# Patient Record
Sex: FEMALE | Race: BLACK OR AFRICAN AMERICAN | NOT HISPANIC OR LATINO | Employment: UNEMPLOYED | URBAN - METROPOLITAN AREA
[De-identification: names, ages, dates, MRNs, and addresses within clinical notes are randomized per-mention and may not be internally consistent; named-entity substitution may affect disease eponyms.]

---

## 2021-09-17 ENCOUNTER — TRANSFERRED RECORDS (OUTPATIENT)
Dept: HEALTH INFORMATION MANAGEMENT | Facility: CLINIC | Age: 61
End: 2021-09-17

## 2021-09-17 LAB — EJECTION FRACTION: 75 %

## 2021-09-20 ENCOUNTER — TRANSFERRED RECORDS (OUTPATIENT)
Dept: HEALTH INFORMATION MANAGEMENT | Facility: CLINIC | Age: 61
End: 2021-09-20

## 2021-09-23 ENCOUNTER — TRANSFERRED RECORDS (OUTPATIENT)
Dept: HEALTH INFORMATION MANAGEMENT | Facility: CLINIC | Age: 61
End: 2021-09-23

## 2022-05-12 ENCOUNTER — HOSPITAL ENCOUNTER (EMERGENCY)
Facility: CLINIC | Age: 62
Discharge: HOME | End: 2022-05-12
Attending: EMERGENCY MEDICINE | Admitting: EMERGENCY MEDICINE
Payer: COMMERCIAL

## 2022-05-12 ENCOUNTER — APPOINTMENT (OUTPATIENT)
Dept: CARDIOLOGY | Facility: CLINIC | Age: 62
End: 2022-05-12
Attending: EMERGENCY MEDICINE
Payer: COMMERCIAL

## 2022-05-12 VITALS
OXYGEN SATURATION: 95 % | SYSTOLIC BLOOD PRESSURE: 132 MMHG | BODY MASS INDEX: 33.99 KG/M2 | DIASTOLIC BLOOD PRESSURE: 91 MMHG | WEIGHT: 180 LBS | HEIGHT: 61 IN | HEART RATE: 90 BPM | TEMPERATURE: 98.6 F | RESPIRATION RATE: 14 BRPM

## 2022-05-12 DIAGNOSIS — E87.6 HYPOKALEMIA: ICD-10-CM

## 2022-05-12 DIAGNOSIS — R00.2 PALPITATIONS: ICD-10-CM

## 2022-05-12 LAB
ALBUMIN SERPL-MCNC: 3.7 G/DL (ref 3.4–5)
ALP SERPL-CCNC: 62 U/L (ref 40–150)
ALT SERPL W P-5'-P-CCNC: 29 U/L (ref 0–50)
ANION GAP SERPL CALCULATED.3IONS-SCNC: 4 MMOL/L (ref 3–14)
AST SERPL W P-5'-P-CCNC: 21 U/L (ref 0–45)
ATRIAL RATE - MUSE: 91 BPM
BASOPHILS # BLD AUTO: 0 10E3/UL (ref 0–0.2)
BASOPHILS NFR BLD AUTO: 1 %
BILIRUB SERPL-MCNC: 0.4 MG/DL (ref 0.2–1.3)
BUN SERPL-MCNC: 18 MG/DL (ref 7–30)
CALCIUM SERPL-MCNC: 9.5 MG/DL (ref 8.5–10.1)
CHLORIDE BLD-SCNC: 109 MMOL/L (ref 94–109)
CO2 SERPL-SCNC: 28 MMOL/L (ref 20–32)
CREAT SERPL-MCNC: 0.93 MG/DL (ref 0.52–1.04)
DIASTOLIC BLOOD PRESSURE - MUSE: NORMAL MMHG
EOSINOPHIL # BLD AUTO: 0.1 10E3/UL (ref 0–0.7)
EOSINOPHIL NFR BLD AUTO: 2 %
ERYTHROCYTE [DISTWIDTH] IN BLOOD BY AUTOMATED COUNT: 14.8 % (ref 10–15)
GFR SERPL CREATININE-BSD FRML MDRD: 69 ML/MIN/1.73M2
GLUCOSE BLD-MCNC: 127 MG/DL (ref 70–99)
HCT VFR BLD AUTO: 40.4 % (ref 35–47)
HGB BLD-MCNC: 12.9 G/DL (ref 11.7–15.7)
HOLD SPECIMEN: NORMAL
IMM GRANULOCYTES # BLD: 0 10E3/UL
IMM GRANULOCYTES NFR BLD: 0 %
INTERPRETATION ECG - MUSE: NORMAL
LYMPHOCYTES # BLD AUTO: 2.5 10E3/UL (ref 0.8–5.3)
LYMPHOCYTES NFR BLD AUTO: 54 %
MCH RBC QN AUTO: 30.2 PG (ref 26.5–33)
MCHC RBC AUTO-ENTMCNC: 31.9 G/DL (ref 31.5–36.5)
MCV RBC AUTO: 95 FL (ref 78–100)
MONOCYTES # BLD AUTO: 0.3 10E3/UL (ref 0–1.3)
MONOCYTES NFR BLD AUTO: 8 %
NEUTROPHILS # BLD AUTO: 1.6 10E3/UL (ref 1.6–8.3)
NEUTROPHILS NFR BLD AUTO: 35 %
NRBC # BLD AUTO: 0 10E3/UL
NRBC BLD AUTO-RTO: 0 /100
P AXIS - MUSE: 43 DEGREES
PLATELET # BLD AUTO: 182 10E3/UL (ref 150–450)
POTASSIUM BLD-SCNC: 3 MMOL/L (ref 3.4–5.3)
PR INTERVAL - MUSE: 236 MS
PROT SERPL-MCNC: 7.6 G/DL (ref 6.8–8.8)
QRS DURATION - MUSE: 98 MS
QT - MUSE: 382 MS
QTC - MUSE: 469 MS
R AXIS - MUSE: 3 DEGREES
RBC # BLD AUTO: 4.27 10E6/UL (ref 3.8–5.2)
SODIUM SERPL-SCNC: 141 MMOL/L (ref 133–144)
SYSTOLIC BLOOD PRESSURE - MUSE: NORMAL MMHG
T AXIS - MUSE: 20 DEGREES
TROPONIN I SERPL HS-MCNC: 6 NG/L
TSH SERPL DL<=0.005 MIU/L-ACNC: 0.44 MU/L (ref 0.4–4)
VENTRICULAR RATE- MUSE: 91 BPM
WBC # BLD AUTO: 4.5 10E3/UL (ref 4–11)

## 2022-05-12 PROCEDURE — 93005 ELECTROCARDIOGRAM TRACING: CPT | Mod: 59

## 2022-05-12 PROCEDURE — 93248 EXT ECG>7D<15D REV&INTERPJ: CPT | Performed by: INTERNAL MEDICINE

## 2022-05-12 PROCEDURE — 250N000013 HC RX MED GY IP 250 OP 250 PS 637: Performed by: EMERGENCY MEDICINE

## 2022-05-12 PROCEDURE — 84443 ASSAY THYROID STIM HORMONE: CPT | Performed by: EMERGENCY MEDICINE

## 2022-05-12 PROCEDURE — 84484 ASSAY OF TROPONIN QUANT: CPT | Performed by: EMERGENCY MEDICINE

## 2022-05-12 PROCEDURE — 93246 EXT ECG>7D<15D RECORDING: CPT

## 2022-05-12 PROCEDURE — 99284 EMERGENCY DEPT VISIT MOD MDM: CPT | Mod: 25

## 2022-05-12 PROCEDURE — 36415 COLL VENOUS BLD VENIPUNCTURE: CPT | Performed by: EMERGENCY MEDICINE

## 2022-05-12 PROCEDURE — 85025 COMPLETE CBC W/AUTO DIFF WBC: CPT | Performed by: EMERGENCY MEDICINE

## 2022-05-12 PROCEDURE — 80053 COMPREHEN METABOLIC PANEL: CPT | Performed by: EMERGENCY MEDICINE

## 2022-05-12 RX ORDER — POTASSIUM CHLORIDE 1.5 G/1.58G
40 POWDER, FOR SOLUTION ORAL ONCE
Status: COMPLETED | OUTPATIENT
Start: 2022-05-12 | End: 2022-05-12

## 2022-05-12 RX ORDER — POTASSIUM CHLORIDE 1500 MG/1
20 TABLET, EXTENDED RELEASE ORAL 2 TIMES DAILY
Qty: 28 TABLET | Refills: 0 | Status: SHIPPED | OUTPATIENT
Start: 2022-05-12 | End: 2022-05-26

## 2022-05-12 RX ADMIN — POTASSIUM CHLORIDE 40 MEQ: 1.5 POWDER, FOR SOLUTION ORAL at 17:17

## 2022-05-12 NOTE — ED PROVIDER NOTES
"  History     Chief Complaint:  Palpitations       HPI   Marylou Roy is a 62 year old female who presents with an episode of acute onset of palpitations today.  Patient believes that her heart rate was somewhere between 120 and 150 bpm.  This is happened to her several times in the past.  When these episodes come on the patient has a sense of feeling unwell, a feeling of a strange sensation in her chest (not a pain) and a feeling of dizziness and near/presyncope, but she never has a syncopal spell.  The patient has had a work-up for this in [place] in the past and in [place] she is from.  The work-up is included at a minimum a stress EKG (and possibly echo), nuclear perfusion testing, and a Holter monitor that was applied for 2 weeks but only collected about 5 days worth of data.  No etiology to date has been found.  The last episode was about a year ago.  The patient is vacationing in [place] for about 2 months.  She had a repeat episode today where she had a racing heartbeat and felt lightheaded and came in for further assessment.    ROS:  Review of Systems  There is no headache.  No chest pain or shortness of breath at this time.  No abdominal pain.  No back pain.  No leg swelling.  Her thyroid was checked a year ago and was normal.  All other systems are negative.    Allergies:  No Known Allergies     Medications:    Patient notes that she takes losartan for high blood pressure she also takes hydrochlorothiazide.    Past Medical History:    Palpitations      Social History:   The patient is from [place].  She has had a work-up in [place].  She is vacationing in [place] for the next 2 months.  Her son is a paramedic.    PCP: No primary care provider on file.     Physical Exam   Patient Vitals for the past 24 hrs:   BP Temp Temp src Pulse Resp SpO2 Height Weight   05/12/22 1551 (!) 132/91 98.6  F (37  C) Oral 90 -- 95 % 1.549 m (5' 1\") 81.6 kg (180 lb)   05/12/22 1549 -- " 98.6  F (37  C) Oral 93 14 -- -- --        Physical Exam  General: Resting comfortably on the gurney  Head:  The scalp, face, and head appear normal  Eyes:  The pupils are equal, round, and reactive to light    There is no nystagmus    Extraocular muscles are intact    Conjunctivae and sclerae are normal  ENT:    The nose is normal    Pinnae are normal    The oropharynx is normal    Uvula is in the midline  Neck:  Normal range of motion    There is no rigidity noted    There is no midline cervical spine pain/tenderness    Trachea is in the midline    No mass is detected  CV:  Regular rate and underlying rhythm     Normal S1/S2, no S3/S4    No pathological murmur detected  Resp:  Lungs are clear    There is no tachypnea    Non-labored    No rales    No wheezing   GI:  Abdomen is soft, there is no rigidity    No distension    No tympani    No rebound tenderness     Non-surgical without peritoneal features  MS:  Normal muscular tone    Symmetric motor strength    No major joint effusions    No asymmetric leg swelling, no calf tenderness  Skin:  No rash or acute skin lesions noted  Neuro: Speech is normal and fluent  Psych:  Awake. Alert.      Normal affect.  Appropriate interactions.  Lymph: No anterior cervical lymphadenopathy noted              Emergency Department Course   ECG:  Twelve-lead EKG reveals normal sinus rhythm at 91 bpm.  She has a first-degree AV block with a NJ interval of 236 ms.  She has narrow QRS complexes at 98 ms.  Her QT interval is 382 ms with a QTC at 469 ms.  Her R axis is 3 degrees.  There is no evidence of ST or T wave abnormality.  There is no evidence of ischemia or myocardial infarction.  There is no evidence of Brugada syndrome.  This emergency department electrocardiogram was obtained at 1559 hrs.  It is read by Dr. Cooper      Laboratory:  Labs Ordered and Resulted from Time of ED Arrival to Time of ED Departure   COMPREHENSIVE METABOLIC PANEL - Abnormal       Result Value    Sodium 141       Potassium 3.0 (*)     Chloride 109      Carbon Dioxide (CO2) 28      Anion Gap 4      Urea Nitrogen 18      Creatinine 0.93      Calcium 9.5      Glucose 127 (*)     Alkaline Phosphatase 62      AST 21      ALT 29      Protein Total 7.6      Albumin 3.7      Bilirubin Total 0.4      GFR Estimate 69     TROPONIN I - Normal    Troponin I High Sensitivity 6     TSH WITH FREE T4 REFLEX - Normal    TSH 0.44     CBC WITH PLATELETS AND DIFFERENTIAL    WBC Count 4.5      RBC Count 4.27      Hemoglobin 12.9      Hematocrit 40.4      MCV 95      MCH 30.2      MCHC 31.9      RDW 14.8      Platelet Count 182      % Neutrophils 35      % Lymphocytes 54      % Monocytes 8      % Eosinophils 2      % Basophils 1      % Immature Granulocytes 0      NRBCs per 100 WBC 0      Absolute Neutrophils 1.6      Absolute Lymphocytes 2.5      Absolute Monocytes 0.3      Absolute Eosinophils 0.1      Absolute Basophils 0.0      Absolute Immature Granulocytes 0.0      Absolute NRBCs 0.0          Procedures       Emergency Department Course:             Reviewed:  I reviewed nursing notes, vitals and past medical history    Assessments:  4:15 PM   I obtained history and examined the patient as noted above.    I rechecked the patient and reviewed all findings.      Interventions:  Medications - No data to display     Disposition:  The patient was discharged to home.     Impression & Plan      Medical Decision Making:  This patient presents with an episode of palpitations that happened earlier today.  She has been worked up for this condition in the past and no etiology has been determined to date.  It clearly sounds like an episode of SVT (could be atrial fibrillation, could be AVNRT) as the patient feels a sense of racing heartbeat, she has objectively noted the tachycardia as high as 150, and she feels slightly presyncopal.  This has not been captured on Holter monitoring to date.  Her potassium was low today from her hydrochlorothiazide  and this could have been a precipitant or dysrhythmia.  Ventricular dysrhythmias are in the differential diagnosis but less likely.  She has had provocative testing for coronary artery disease screening which has been negative.  I will put the patient back on a Zio patch for 2 weeks to look for arrhythmias.  We will get her on potassium replacement.  I advised that she may need to take concomitant potassium in the future if she stays on hydrochlorothiazide.  We will have her follow-up with cardiology here.  The patient's laboratories are negative.  Screening high-sensitivity troponin is undetectable.  There is low likelihood for acute coronary syndrome or myocardial infarction.      Diagnosis:    ICD-10-CM    1. Palpitations  R00.2    2. Hypokalemia  E87.6         Discharge Medications:  New Prescriptions    POTASSIUM CHLORIDE ER (KLOR-CON M) 20 MEQ CR TABLET    Take 1 tablet (20 mEq) by mouth 2 times daily for 14 days        5/12/2022   Reese Cooper MD Rock, Michael P, MD  05/12/22 1817

## 2022-05-12 NOTE — ED TRIAGE NOTES
Per EMS report:called with report of faintness, dizziness, shortness of breath, nausea, and palpitations. EKG en route noted first degree block. 4L of o2 applied en route. zofran given en route. Reports having similar episode in the past, having workup in [place] but came back inconclusive. [place]oved [place] from there approximately 4 months ago.      Triage Assessment     Row Name 05/12/22 1448       Triage Assessment (Adult)    Airway WDL WDL       Respiratory WDL    Respiratory WDL X;rhythm/pattern    Rhythm/Pattern, Respiratory shallow;shortness of breath       Skin Circulation/Temperature WDL    Skin Circulation/Temperature WDL WDL       Cardiac WDL    Cardiac WDL X;chest pain   c/o palpitation       Chest Pain Assessment    Associated Signs/Symptoms dizziness       Cognitive/Neuro/Behavioral WDL    Cognitive/Neuro/Behavioral WDL WDL

## 2022-05-12 NOTE — DISCHARGE INSTRUCTIONS
Discharge Instructions  Palpitations    Palpitations are an unusual awareness of your heartbeat. People often describe this as the heart skipping, fluttering, racing, irregular, or pounding. At this time, your provider has found no signs that your palpitations are due to a serious or life-threatening condition. However, sometimes there is a serious problem that does not show up right away.    Palpitations can be caused by caffeine, cigarettes, diet pills, energy drinks or supplements, other stimulants, and medications and street drugs. They can also be caused by anxiety, hormone conditions such as high thyroid, and other medical conditions. Sometimes they are a sign of abnormal rhythm in the heart. At this time, your provider did not find any dangerous cause of your symptoms.    Generally, every Emergency Department visit should have a follow-up clinic visit with either a primary or a specialty clinic/provider. Please follow-up as instructed by your emergency provider today.    Return to the Emergency Department if:  You get chest pain or tightness.  You are short of breath.  You get very weak or tired.  You pass out or faint.  Your heart rate is over 120 beats per minute for more than 10 minutes while you are resting.   You have anything else that worries you.    What can I do to help myself?  Fill any prescriptions the provider gave you and take them right away.   Follow your provider s instructions about the prescription medicines you are on. Sometimes the provider may tell you to stop taking a medicine or change the dose.  If you smoke, this may be a good time to quit! The less you can smoke, the better.  Do not use energy drinks, diet pills, or stimulants. Limit your use of caffeine.  If you were given a prescription for medicine here today, be sure to read all of the information (including the package insert) that comes with your prescription.  This will include important information about the medicine, its  side effects, and any warnings that you need to know about.  The pharmacist who fills the prescription can provide more information and answer questions you may have about the medicine.  If you have questions or concerns that the pharmacist cannot address, please call or return to the Emergency Department.     Remember that you can always come back to the Emergency Department if you are not able to see your regular provider in the amount of time listed above, if you get any new symptoms, or if there is anything that worries you.      Start taking potassium twice a day  We will get a 2-week Zio patch placed.  Return according to instructions.

## 2022-06-02 ENCOUNTER — ANCILLARY PROCEDURE (OUTPATIENT)
Dept: GENERAL RADIOLOGY | Facility: CLINIC | Age: 62
End: 2022-06-02
Attending: FAMILY MEDICINE
Payer: COMMERCIAL

## 2022-06-02 ENCOUNTER — OFFICE VISIT (OUTPATIENT)
Dept: ORTHOPEDICS | Facility: CLINIC | Age: 62
End: 2022-06-02
Payer: COMMERCIAL

## 2022-06-02 VITALS — BODY MASS INDEX: 34.41 KG/M2 | WEIGHT: 187 LBS | HEIGHT: 62 IN

## 2022-06-02 DIAGNOSIS — M25.511 BILATERAL SHOULDER PAIN: ICD-10-CM

## 2022-06-02 DIAGNOSIS — M25.512 BILATERAL SHOULDER PAIN: ICD-10-CM

## 2022-06-02 DIAGNOSIS — M25.511 CHRONIC PAIN OF BOTH SHOULDERS: Primary | ICD-10-CM

## 2022-06-02 DIAGNOSIS — G89.29 CHRONIC PAIN OF BOTH SHOULDERS: Primary | ICD-10-CM

## 2022-06-02 DIAGNOSIS — M54.2 CERVICALGIA: ICD-10-CM

## 2022-06-02 DIAGNOSIS — M25.512 CHRONIC PAIN OF BOTH SHOULDERS: Primary | ICD-10-CM

## 2022-06-02 LAB
BASOPHILS # BLD AUTO: 0 10E3/UL (ref 0–0.2)
BASOPHILS NFR BLD AUTO: 1 %
CRP SERPL-MCNC: <2.9 MG/L (ref 0–8)
EOSINOPHIL # BLD AUTO: 0.1 10E3/UL (ref 0–0.7)
EOSINOPHIL NFR BLD AUTO: 2 %
ERYTHROCYTE [DISTWIDTH] IN BLOOD BY AUTOMATED COUNT: 14.7 % (ref 10–15)
ERYTHROCYTE [SEDIMENTATION RATE] IN BLOOD BY WESTERGREN METHOD: 65 MM/HR (ref 0–30)
HCT VFR BLD AUTO: 40 % (ref 35–47)
HGB BLD-MCNC: 12.7 G/DL (ref 11.7–15.7)
IMM GRANULOCYTES # BLD: 0 10E3/UL
IMM GRANULOCYTES NFR BLD: 0 %
LYMPHOCYTES # BLD AUTO: 1.9 10E3/UL (ref 0.8–5.3)
LYMPHOCYTES NFR BLD AUTO: 44 %
MCH RBC QN AUTO: 30 PG (ref 26.5–33)
MCHC RBC AUTO-ENTMCNC: 31.8 G/DL (ref 31.5–36.5)
MCV RBC AUTO: 95 FL (ref 78–100)
MONOCYTES # BLD AUTO: 0.4 10E3/UL (ref 0–1.3)
MONOCYTES NFR BLD AUTO: 9 %
NEUTROPHILS # BLD AUTO: 1.9 10E3/UL (ref 1.6–8.3)
NEUTROPHILS NFR BLD AUTO: 45 %
PLATELET # BLD AUTO: 202 10E3/UL (ref 150–450)
RBC # BLD AUTO: 4.23 10E6/UL (ref 3.8–5.2)
WBC # BLD AUTO: 4.3 10E3/UL (ref 4–11)

## 2022-06-02 PROCEDURE — 86200 CCP ANTIBODY: CPT | Performed by: FAMILY MEDICINE

## 2022-06-02 PROCEDURE — 86431 RHEUMATOID FACTOR QUANT: CPT | Performed by: FAMILY MEDICINE

## 2022-06-02 PROCEDURE — 85025 COMPLETE CBC W/AUTO DIFF WBC: CPT | Performed by: FAMILY MEDICINE

## 2022-06-02 PROCEDURE — 99204 OFFICE O/P NEW MOD 45 MIN: CPT | Performed by: FAMILY MEDICINE

## 2022-06-02 PROCEDURE — 86140 C-REACTIVE PROTEIN: CPT | Performed by: FAMILY MEDICINE

## 2022-06-02 PROCEDURE — 85652 RBC SED RATE AUTOMATED: CPT | Performed by: FAMILY MEDICINE

## 2022-06-02 PROCEDURE — 36415 COLL VENOUS BLD VENIPUNCTURE: CPT | Performed by: FAMILY MEDICINE

## 2022-06-02 PROCEDURE — 73030 X-RAY EXAM OF SHOULDER: CPT | Mod: TC | Performed by: INTERNAL MEDICINE

## 2022-06-02 RX ORDER — LOSARTAN POTASSIUM 100 MG/1
100 TABLET ORAL DAILY
COMMUNITY
End: 2022-06-20

## 2022-06-02 RX ORDER — ASPIRIN 81 MG/1
81 TABLET, CHEWABLE ORAL DAILY
COMMUNITY

## 2022-06-02 RX ORDER — METHYLPREDNISOLONE 4 MG
TABLET, DOSE PACK ORAL
Qty: 21 TABLET | Refills: 0 | Status: SHIPPED | OUTPATIENT
Start: 2022-06-02

## 2022-06-02 ASSESSMENT — PAIN SCALES - GENERAL: PAINLEVEL: MODERATE PAIN (4)

## 2022-06-02 NOTE — PROGRESS NOTES
"CHIEF COMPLAINT:  Pain of the Right Shoulder, Pain of the Left Shoulder, and Pain of the Neck       HISTORY OF PRESENT ILLNESS  Ms. Roy is a pleasant 62 year old year old female who presents to clinic today with bilateral shoulder and neck pain. She is right hand dominant. Her right shoulder is more painful than the left. Insidious onset around the same time 6 months ago.  Weakness and difficulty bringing arms overhead.  Cannot reach behind to unclasp nuris.  She reports very occasional \"shocking\", numbness and tingling down right upper arm to elbow. This does not occur on the right. She has intermittent neck pain that is worse in the morning.     Onset: gradual  Location: bilateral shoulder and neck   Quality:  sharp and burning  Duration: 6 months   Severity: 7/10 at worst  Timing:intermittent episodes   Modifying factors:  resting and non-use makes it better, movement and use makes it worse  Associated signs & symptoms: pain  Previous similar pain: No  Treatments to date: ibuprofen, Tylenol    Additional history: as documented    Review of Systems:    Have you recently had a a fever, chills, weight loss? No    Do you have any vision problems? No    Do you have any chest pain or edema? No    Do you have any shortness of breath or wheezing?  No    Do you have stomach problems? No    Do you have any numbness or focal weakness? No    Do you have diabetes? No    Do you have problems with bleeding or clotting? No    Do you have an rashes or other skin lesions? No    MEDICAL HISTORY  There is no problem list on file for this patient.      Current Outpatient Medications   Medication Sig Dispense Refill     aspirin (ASA) 81 MG chewable tablet Take 81 mg by mouth daily       losartan (COZAAR) 100 MG tablet Take 100 mg by mouth daily         No Known Allergies    No family history on file.    Additional medical/Social/Surgical histories reviewed in [facility] and updated as appropriate.       PHYSICAL EXAM  Ht 1.562 m (5' " "1.5\")   Wt 84.8 kg (187 lb)   BMI 34.76 kg/m        General  - normal appearance, in no obvious distress  HEENT  - conjunctivae not injected, moist mucous membranes  CV  - normal radial pulse  Pulm  - normal respiratory pattern, non-labored  Musculoskeletal - cervical spine  - inspection: normal bone and joint alignment, no obvious kyphosis  - palpation: Tender scalenes bilaterally  - ROM: Decreased in all planes, painful with rotation and side bending bilaterally.  - strength: Intact C8-T1.  - special tests:  (-) Spurling bilaterally  Musculoskeletal - Right shoulder  - inspection: normal bone and joint alignment, no obvious deformity, no scapular winging, no AC step-off  - palpation: Tender rotator cuff insertions.  - ROM:  Decreased FE, abduction and IR to L5 region only. No stiffness passively.  - strength: 5/5  strength, 5/5 in all shoulder planes  - special tests:  (-) Speed's  (+) Neer  (+) Hawkin's  (+) Willie pain/weakness symmetric  (-) [place]'s  Neuro  - no sensory or motor deficit, grossly normal coordination, normal muscle tone  Skin  - no ecchymosis, erythema, warmth, or induration, no obvious rash  Psych  - interactive, appropriate, normal mood and affect    IMAGING : XR bilateral shoulders. Final results and radiologist's interpretation, available in the [facility] health record. Images were reviewed with the patient/family members in the office today. My personal interpretation of the performed imaging is mild AC degenerative changes.  No significant glenohumeral osteoarthritis.     ASSESSMENT & PLAN  Ms. Roy is a 62 year old year old female who presents to clinic today with chronic bilateral shoulder pain L>R, loss of motion and weakness, and cervical pain.    DDx includes rotator cuff tendinopathy,  PMR or other rheumatologic cause, or cervical radiculitis.     Diagnosis: Chronic pain of bilateral shoulders    -Labs to r/o rheumatologic cause such as polymyalgia rheumatica  -Start formal physical " therapy  -Trial of medrol nasra  -Follow up 6 weeks after PT, willl mychart lab results    It was a pleasure seeing Marylou today.    Gian Rapp DO, CAQSM  Primary Care Sports Medicine

## 2022-06-02 NOTE — Clinical Note
"    6/2/2022         RE: Marylou Roy  [address]  [facility] [address]        Dear Colleague,    Thank you for referring your patient, Marylou Roy, to the [facility] [place]. Please see a copy of my visit note below.    CHIEF COMPLAINT:  Pain of the Right Shoulder, Pain of the Left Shoulder, and Pain of the Neck       HISTORY OF PRESENT ILLNESS  Ms. Roy is a pleasant 62 year old year old female who presents to clinic today with bilateral shoulder and neck pain. She is right hand dominant. Her right shoulder is more painful than the left. Insidious onset around the same time 6 months ago.  Weakness and difficulty bringing arms overhead.  Cannot reach behind to unclasp nuris.  She reports very occasional \"shocking\", numbness and tingling down right upper arm to elbow. This does not occur on the right. She has intermittent neck pain that is worse in the morning.     Onset: gradual  Location: bilateral shoulder and neck   Quality:  sharp and burning  Duration: 6 months   Severity: 7/10 at worst  Timing:intermittent episodes   Modifying factors:  resting and non-use makes it better, movement and use makes it worse  Associated signs & symptoms: pain  Previous similar pain: No  Treatments to date: ibuprofen, Tylenol    Additional history: as documented    Review of Systems:    Have you recently had a a fever, chills, weight loss? No    Do you have any vision problems? No    Do you have any chest pain or edema? No    Do you have any shortness of breath or wheezing?  No    Do you have stomach problems? No    Do you have any numbness or focal weakness? No    Do you have diabetes? No    Do you have problems with bleeding or clotting? No    Do you have an rashes or other skin lesions? No    MEDICAL HISTORY  There is no problem list on file for this patient.      Current Outpatient Medications   Medication Sig Dispense Refill     aspirin (ASA) 81 MG chewable tablet Take 81 mg by " "mouth daily       losartan (COZAAR) 100 MG tablet Take 100 mg by mouth daily         No Known Allergies    No family history on file.    Additional medical/Social/Surgical [facility] and updated as appropriate.       PHYSICAL EXAM  Ht 1.562 m (5' 1.5\")   Wt 84.8 kg (187 lb)   BMI 34.76 kg/m        General  - normal appearance, in no obvious distress  HEENT  - conjunctivae not injected, moist mucous membranes  CV  - normal radial pulse  Pulm  - normal respiratory pattern, non-labored  Musculoskeletal - cervical spine  - inspection: normal bone and joint alignment, no obvious kyphosis  - palpation: Tender scalenes bilaterally  - ROM: Decreased in all planes, painful with rotation and side bending bilaterally.  - strength: Intact C8-T1.  - special tests:  (-) Spurling bilaterally  Musculoskeletal - Right shoulder  - inspection: normal bone and joint alignment, no obvious deformity, no scapular winging, no AC step-off  - palpation: Tender rotator cuff insertions.  - ROM:  Decreased FE, abduction and IR to L5 region only. No stiffness passively.  - strength: 5/5  strength, 5/5 in all shoulder planes  - special tests:  (-) Speed's  (+) Neer  (+) Hawkin's  (+) Willie pain/weakness symmetric  (-) [place]'s  Neuro  - no sensory or motor deficit, grossly normal coordination, normal muscle tone  Skin  - no ecchymosis, erythema, warmth, or induration, no obvious rash  Psych  - interactive, appropriate, normal mood and affect    IMAGING : XR bilateral shoulders. Final results and radiologist's interpretation, available in the [facility] health record. Images were reviewed with the patient/family members in the office today. My personal interpretation of the performed imaging is mild AC degenerative changes.  No significant glenohumeral osteoarthritis.     ASSESSMENT & PLAN  Ms. Roy is a 62 year old year old female who presents to clinic today with chronic bilateral shoulder pain L>R, loss of motion and weakness, " and cervical pain.    DDx includes rotator cuff tendinopathy,  PMR or other rheumatologic cause, or cervical radiculitis.     Diagnosis: Chronic pain of bilateral shoulders    -Labs to r/o rheumatologic cause such as polymyalgia rheumatica  -Start formal physical therapy  -Trial of medrol nasra  -Follow up 6 weeks after PT, willl mychart lab results    It was a pleasure seeing Marylou today.    Gian Rapp DO, CAM  Primary Care Sports Medicine      Again, thank you for allowing me to participate in the care of your patient.        Sincerely,        Gian Rapp DO

## 2022-06-02 NOTE — PATIENT INSTRUCTIONS
Thank you for choosing [facility]    DR [facility] CLINIC LOCATIONS  [place] [address] [facility], [address]th [place]   [place], [place], [address] [place] [address]   [phone]       APPOINTMENTS: [phone]    CARE QUESTIONS: [phone],    BILLING QUESTIONS: [phone]    FAX NUMBER: [phone]        Follow up: 6 weeks      1. Chronic pain of both shoulders    2. Cervicalgia      Physical Therapy orders have been placed with [facility] (formally [facility])  You can call [phone] to schedule at your convenience.     Lab orders have been placed. You may complete these after your appointment today or may contact your primary care office to complete these. Dr. Rapp will relay your results via [employer].

## 2022-06-03 LAB — RHEUMATOID FACT SER NEPH-ACNC: <6 IU/ML

## 2022-06-06 LAB — CCP AB SER IA-ACNC: 3.5 U/ML

## 2022-06-08 ENCOUNTER — THERAPY VISIT (OUTPATIENT)
Dept: PHYSICAL THERAPY | Facility: CLINIC | Age: 62
End: 2022-06-08
Attending: FAMILY MEDICINE
Payer: COMMERCIAL

## 2022-06-08 DIAGNOSIS — G89.29 CHRONIC PAIN OF BOTH SHOULDERS: ICD-10-CM

## 2022-06-08 DIAGNOSIS — M25.512 CHRONIC PAIN OF BOTH SHOULDERS: ICD-10-CM

## 2022-06-08 DIAGNOSIS — M25.511 CHRONIC PAIN OF BOTH SHOULDERS: ICD-10-CM

## 2022-06-08 DIAGNOSIS — M54.2 CERVICALGIA: ICD-10-CM

## 2022-06-08 DIAGNOSIS — M54.2 NECK PAIN: ICD-10-CM

## 2022-06-08 PROCEDURE — 97112 NEUROMUSCULAR REEDUCATION: CPT | Mod: GP | Performed by: PHYSICAL THERAPIST

## 2022-06-08 PROCEDURE — 97110 THERAPEUTIC EXERCISES: CPT | Mod: GP | Performed by: PHYSICAL THERAPIST

## 2022-06-08 PROCEDURE — 97161 PT EVAL LOW COMPLEX 20 MIN: CPT | Mod: GP | Performed by: PHYSICAL THERAPIST

## 2022-06-08 NOTE — PROGRESS NOTES
"Physical Therapy Initial Evaluation  Subjective:  The history is provided by the patient. No  was used.   Patient Health History  Marylou Roy being seen for \"pain in both shoulders and neck\".     Problem began: 12/8/2021 (6 months ago).   Problem occurred: no injury/unknown   Pain is reported as 3/10 (ranges 3-7/10) on pain scale.  General health as reported by patient is good.  Pertinent medical history includes: high blood pressure and overweight.   Red flags:  None as reported by patient.  Medical allergies: none.    Other surgery history details: hysterectomy.        Current occupation is retired medical specialist.   Primary job tasks include:  Computer work.   Other job/home tasks details: lifting grocerises; housework.                Therapist Generated HPI Evaluation  Problem details: Six months ago (December 2021) onset for unknown reason of B neck and R>L shoulder pain, R upper arm \"shocking sensation\" to forearm, R scapular pain. Somewhat improved (20%) since dosepak. Neck pain is worse looking over shoulder, looking up/down. R>L shoulder pain is worse reaching OH or behind back--cannot fasten bra behind back. CC is R shoulder pain. Disturbed sleep of 2-3 hours d/t R shoulder pain.         Type of problem:  Cervical spine.    This is a chronic condition.  Condition occurred with:  Insidious onset.  Where condition occurred: for unknown reasons.    Pain is described as aching and is intermittent.  Pain is worse during the day.  Since onset symptoms are unchanged.  Symptoms are exacerbated by rotating head, looking up or down, lifting and other (reaching overhead or behind back)  and relieved by rest.      Work activity restrictions: retired.  Barriers include:  None as reported by patient.    Therapist Generated HPI Evaluation  Problem details: Six months ago (December 2021) onset for unknown reason of B neck and R>L shoulder pain, R upper arm \"shocking sensation\" to forearm, R " scapular pain. Somewhat improved (20%) since dosepak. Neck pain is worse looking over shoulder, looking up/down. R>L shoulder pain is worse reaching OH or behind back--cannot fasten bra behind back. CC is R shoulder pain. Disturbed sleep of 2-3 hours d/t R shoulder pain.         Type of problem:  Bilateral shoulders.    This is a chronic condition.  Condition occurred with:  Unknown cause.  Where condition occurred: for unknown reasons.    Pain is described as aching and shooting and is intermittent.  Pain is worse during the day.  Since onset symptoms are unchanged.  Symptoms are exacerbated by lying on extremity, using arm overhead and using arm behind back  and relieved by rest.      Barriers include:  None as reported by patient.                        Objective:  System              Cervical/Thoracic Evaluation    AROM:  AROM Cervical:    Flexion:            100% (pulling down my back)  Extension:       75% (pulling down my back)  Rotation:         Left: 90%     Right: 90%  Side Bend:      Left: 100%     Right:  90%    Strength: major loss of c-retr  Headaches: none  Cervical Myotomes:  normal                  DTR's:  not assessed          Cervical Dermatomes:  not assessed                                   Shoulder Evaluation:  ROM:  AROM:    Flexion:  Left:  Wnl    Right:  10% loss (PDM, ERP)    Abduction:  Left: wnl   Right:  10% loss (PDM, ERP)      External Rotation:  Left:  Wnl    Right:  Wnl            Extension/Internal Rotation:  Left:  T9    Right:  T9          Strength:    Flexion: Left:5/5   Pain:    Right: 5/5     Pain:     Abduction:  Left: 4+/5  Pain:    Right: 5-/5     Pain:    Internal Rotation:  Left:5/5     Pain:    Right: 5/5     Pain:  External Rotation:   Left:5/5     Pain:   Right:5/5     Pain:              Special Tests:      Left shoulder negative for the following special tests:  Impingement and Rotator cuff tear  Right shoulder positive for the following special tests:Impingement  (+Neer's, Garg)  Right shoulder negative for the following special tests:Rotator cuff tear    Mobility Tests:    Glenohumeral anterior left:  Normal  Glenohumeral anterior right:  Normal  Glenohumeral posterior left:  Normal  Glenohumeral posterior right:  Normal                                             [facility] Cervical Evaluation    Posture:  Sitting: fair  Standing: fair  Protruding Head: yes  Wry Neck: no  Correction of Posture: no effect      Test Movements:      RET: During: increases    Repeat RET: During: increases  After: no worse  Mechanical Response: IncROM  RET EXT: During: increases    Repeat RET EXT: During: increases  After: worse  Mechanical Response: IncROM                                                 [facility] Thoracic Evalution:        Test Movements:      Extension:  During:  Increases    Rep Extension:  During:  Increases  After: no worse  Mechanical Response: no effect                                              ROS    Assessment/Plan:    Patient is a 62 year old female with cervical and both sides shoulder complaints.  Her R shoulder flexion and abduction motion is slow and difficult. The shoulder sx's may be stemming from the spine. Started with cervical retraction and posture correction and will assess effects.     Patient has the following significant findings with corresponding treatment plan.                Diagnosis 1:  Neck/Bshoulder pain  Pain -  manual therapy, self management, education, directional preference exercise and home program  Decreased ROM/flexibility - manual therapy, therapeutic exercise and home program  Decreased strength - therapeutic exercise, therapeutic activities and home program  Decreased function - therapeutic activities and home program  Impaired posture - neuro re-education and home program    Therapy Evaluation Codes:   Cumulative Therapy Evaluation is: Low complexity.    Previous and current functional limitations:  (See Goal Flow Sheet for this  information)    Short term and Long term goals: (See Goal Flow Sheet for this information)     Communication ability:  Patient appears to be able to clearly communicate and understand verbal and written communication and follow directions correctly.  Treatment Explanation - The following has been discussed with the patient:   RX ordered/plan of care  Anticipated outcomes  Possible risks and side effects  This patient would benefit from PT intervention to resume normal activities.   Rehab potential is good.    Frequency:  1 X week, once daily  Duration:  for 8 weeks  Discharge Plan:  Achieve all LTG.  Independent in home treatment program.  Reach maximal therapeutic benefit.    Please refer to the daily flowsheet for treatment today, total treatment time and time spent performing 1:1 timed codes.

## 2022-06-15 ENCOUNTER — THERAPY VISIT (OUTPATIENT)
Dept: PHYSICAL THERAPY | Facility: CLINIC | Age: 62
End: 2022-06-15
Payer: COMMERCIAL

## 2022-06-15 DIAGNOSIS — G89.29 CHRONIC PAIN OF BOTH SHOULDERS: ICD-10-CM

## 2022-06-15 DIAGNOSIS — M25.512 CHRONIC PAIN OF BOTH SHOULDERS: ICD-10-CM

## 2022-06-15 DIAGNOSIS — M54.2 NECK PAIN: Primary | ICD-10-CM

## 2022-06-15 DIAGNOSIS — M25.511 CHRONIC PAIN OF BOTH SHOULDERS: ICD-10-CM

## 2022-06-15 PROCEDURE — 97110 THERAPEUTIC EXERCISES: CPT | Mod: GP | Performed by: PHYSICAL THERAPIST

## 2022-06-15 PROCEDURE — 97140 MANUAL THERAPY 1/> REGIONS: CPT | Mod: GP | Performed by: PHYSICAL THERAPIST

## 2022-06-17 ENCOUNTER — OFFICE VISIT (OUTPATIENT)
Dept: CARDIOLOGY | Facility: CLINIC | Age: 62
End: 2022-06-17
Payer: COMMERCIAL

## 2022-06-17 ENCOUNTER — LAB (OUTPATIENT)
Dept: LAB | Facility: CLINIC | Age: 62
End: 2022-06-17

## 2022-06-17 VITALS
HEIGHT: 62 IN | OXYGEN SATURATION: 99 % | BODY MASS INDEX: 33.47 KG/M2 | DIASTOLIC BLOOD PRESSURE: 95 MMHG | WEIGHT: 181.9 LBS | HEART RATE: 67 BPM | SYSTOLIC BLOOD PRESSURE: 145 MMHG

## 2022-06-17 DIAGNOSIS — I10 PRIMARY HYPERTENSION: ICD-10-CM

## 2022-06-17 DIAGNOSIS — R00.2 PALPITATIONS: ICD-10-CM

## 2022-06-17 DIAGNOSIS — I10 PRIMARY HYPERTENSION: Primary | ICD-10-CM

## 2022-06-17 DIAGNOSIS — G47.33 OSA (OBSTRUCTIVE SLEEP APNEA): ICD-10-CM

## 2022-06-17 DIAGNOSIS — R55 PRE-SYNCOPE: ICD-10-CM

## 2022-06-17 DIAGNOSIS — R73.03 PRE-DIABETES: ICD-10-CM

## 2022-06-17 LAB
ANION GAP SERPL CALCULATED.3IONS-SCNC: 4 MMOL/L (ref 3–14)
BUN SERPL-MCNC: 16 MG/DL (ref 7–30)
CALCIUM SERPL-MCNC: 9.6 MG/DL (ref 8.5–10.1)
CHLORIDE BLD-SCNC: 109 MMOL/L (ref 94–109)
CO2 SERPL-SCNC: 27 MMOL/L (ref 20–32)
CREAT SERPL-MCNC: 0.96 MG/DL (ref 0.52–1.04)
GFR SERPL CREATININE-BSD FRML MDRD: 67 ML/MIN/1.73M2
GLUCOSE BLD-MCNC: 103 MG/DL (ref 70–99)
POTASSIUM BLD-SCNC: 3.6 MMOL/L (ref 3.4–5.3)
SODIUM SERPL-SCNC: 140 MMOL/L (ref 133–144)

## 2022-06-17 PROCEDURE — 36415 COLL VENOUS BLD VENIPUNCTURE: CPT | Performed by: INTERNAL MEDICINE

## 2022-06-17 PROCEDURE — 80048 BASIC METABOLIC PNL TOTAL CA: CPT | Performed by: INTERNAL MEDICINE

## 2022-06-17 PROCEDURE — 99204 OFFICE O/P NEW MOD 45 MIN: CPT | Performed by: INTERNAL MEDICINE

## 2022-06-17 RX ORDER — HYDROCHLOROTHIAZIDE 12.5 MG/1
12.5 TABLET ORAL DAILY
COMMUNITY
End: 2022-06-20

## 2022-06-17 RX ORDER — ATORVASTATIN CALCIUM 80 MG/1
80 TABLET, FILM COATED ORAL DAILY
COMMUNITY

## 2022-06-17 NOTE — PROGRESS NOTES
HPI and Plan:   I had the pleasure of seeing Marylou Roy in cardiology clinic who self-referred herself for episodes of palpitations and presyncope.    Marylou is a pleasant 62-year-old female originally from [facility] and has just moved to [place] to be originally with her fiancé.  However, her fiancé unfortunately  unexpectedly.  I conveyed my condolences. She is recently retired.  Her brother is a paramedic.    She has had a history of chest pain in 2016 that prompted a coronary angiography which revealed 30% LAD stenosis at that time.  Since  she has had intermittent palpitations of racing heartbeat up to 1 5160 bpm the last 1 to 2 minutes and associated with presyncope.  Initially it was infrequent and now is occurring about once a week.  She had a recent episode on  where she was just sitting and had racing heart and had presyncope.  Her paramedic brother has checked her pulse when this happens and the heart rates are in the 150s.  She was placed on a Zio patch monitor but during that monitoring period, she had no episodes.  It showed rare PAC or PVC.    She has had additional cardiac work-up and seen a cardiologist in the [facility].  She had a exercise stress nuclear study in 2021 and was negative for ischemia per report.  Echocardiogram revealed normal to hyperdynamic ejection fraction with mild pulmonary hypertension.  She is also seen a pulmonologist who did PFTs which apparently were normal.  She was also on refer for a sleep study which was abnormal and she has moderate obstructive sleep apnea.  She had not been prescribed CPAP but she bought CPAP herself but has not started using it.  She needs some help and wants referral to the sleep clinic.    She also has hypertension.  She is on losartan 100 mg daily.  She was on 25 mg of hydrochlorothiazide but recently her potassium was low at 3 and therefore she was off hydrochlorothiazide.  Her blood pressure started rising and so she  she restarted at 12.5 mg daily.  She is on atorvastatin 80 mg daily and aspirin.    She denies any chest pain or shortness of breath.  No orthopnea or PND.  Her main concern is palpitations and presyncope.    There is no family history of premature CAD.  She is not a smoker.    Exam  See below    Impression    1.  Episodic palpitations with presyncope  Echocardiogram revealed normal to hyperdynamic ejection fraction.  No evidence of structural heart disease.  Stress test revealed no ischemia.  Recent Zio patch monitor did not show any significant arrhythmias but patient was not symptomatic at this time.  I have suggested that we do a 30-day event monitor as she has symptoms at least once or twice a week.  This will help us correlate her symptoms with any arrhythmias.  If there are any significant arrhythmias, EP consultation would be indicated.  If required, we could consider adding beta-blockers in future for symptomatic relief.  Her previous EKG from May revealed sinus rhythm with first-degree AV block with a heart rate were in the 90s.    2.  Hypertension, blood pressure is elevated  She is on losartan 100 g daily and hydrochlorothiazide 12.5 mg daily.  We will check a BMP today to see where her potassium levels are.  If they are low or low normal, I would suggest adding Aldactone 25 mg daily for better blood pressure control as well as improving her potassium levels.  The other option would be to add Bystolic but I am holding it off as I wonder first finish the clinical testing with respect to her palpitations.  The beta-blockers may suppress her palpitations.    3.  Obstructive sleep apnea, will refer to the sleep clinic    4.  History of coronary artery atherosclerosis with 30% LAD stenosis in 2016, continue risk factor modification.  Stress test from 2021 revealed no ischemia but was done in [facility].    Thank you for allowing us to part in the care of this nice patient.  She will return to see our EP PAVEL  after the event monitor is completed.    Sincerely,    Suleman Ren MD          Today's clinic visit entailed:  Review of prior external note(s) from - Outside records from [facility]  Review of the result(s) of each unique test - nuclear test, echo, sleep study, cath report, bmp  Ordering of each unique test    Provider  Link to MDM Help Grid     The level of medical decision making during this visit was of moderate complexity.      Orders Placed This Encounter   Procedures     Basic metabolic panel     Follow-Up with Cardiology PAVEL     Adult Sleep Eval & Management Referral     Cardiac Event Monitor Adult Pediatric       Orders Placed This Encounter   Medications     atorvastatin (LIPITOR) 80 MG tablet     Sig: Take 80 mg by mouth daily Patient reported taking one tablet daily     hydrochlorothiazide (HYDRODIURIL) 12.5 MG tablet     Sig: Take 12.5 mg by mouth daily       There are no discontinued medications.      Encounter Diagnoses   Name Primary?     Primary hypertension Yes     SANTO (obstructive sleep apnea)      Palpitations      Pre-syncope      Pre-diabetes        CURRENT MEDICATIONS:  Current Outpatient Medications   Medication Sig Dispense Refill     aspirin (ASA) 81 MG chewable tablet Take 81 mg by mouth daily       atorvastatin (LIPITOR) 80 MG tablet Take 80 mg by mouth daily Patient reported taking one tablet daily       hydrochlorothiazide (HYDRODIURIL) 12.5 MG tablet Take 12.5 mg by mouth daily       losartan (COZAAR) 100 MG tablet Take 100 mg by mouth daily       methylPREDNISolone (MEDROL DOSEPAK) 4 MG tablet therapy pack Follow Package Directions (Patient not taking: Reported on 6/17/2022) 21 tablet 0       ALLERGIES   No Known Allergies    PAST MEDICAL HISTORY:  Past Medical History:   Diagnosis Date     Premature atrial contraction 05/12/2022     PVC's (premature ventricular contractions) 05/12/2022       PAST SURGICAL HISTORY:  No past surgical history on file.    FAMILY HISTORY:  No family  "history on file.    SOCIAL HISTORY:  Social History     Socioeconomic History     Marital status: Single     Spouse name: None     Number of children: None     Years of education: None     Highest education level: None   Tobacco Use     Smoking status: Never Smoker     Smokeless tobacco: Never Used   Substance and Sexual Activity     Alcohol use: Never       Review of Systems:  Skin:          Eyes:         ENT:         Respiratory:  Negative shortness of breath;dyspnea on exertion     Cardiovascular:  chest pain;Negative;edema palpitations;Positive for;lightheadedness;dizziness;syncope or near-syncope most recent episode of palpitations and near-syncope last sunday, occasional \"cramping\" left side of neck and jaw  Gastroenterology:        Genitourinary:         Musculoskeletal:  Positive for neck pain    Neurologic:         Psychiatric:         Heme/Lymph/Imm:  Negative allergies    Endocrine:  Negative thyroid disorder;diabetes      Physical Exam:  Vitals: BP (!) 145/95 (BP Location: Left arm, Patient Position: Sitting)   Pulse 67   Ht 1.562 m (5' 1.5\")   Wt 82.5 kg (181 lb 14.4 oz)   SpO2 99%   BMI 33.81 kg/m      Constitutional:  well nourished        Skin:             Head:  normocephalic        Eyes:  sclera white        Lymph:      ENT:           Neck:  JVP normal        Respiratory:  clear to auscultation         Cardiac: normal S1 and S2;regular rhythm   distant heart sounds                                                     GI:  not assessed this visit        Extremities and Muscular Skeletal:  no edema              Neurological:  no gross motor deficits        Psych:  Alert and Oriented x 3        CC  No referring provider defined for this encounter.              "

## 2022-06-17 NOTE — LETTER
2022    Anjana PAT MD  [facility] [address]    [place] [address]    RE: Marylou Roy       Dear Colleague,     I had the pleasure of seeing Marylou Roy in the [facility].  HPI and Plan:   I had the pleasure of seeing Marylou Roy in cardiology clinic who self-referred herself for episodes of palpitations and presyncope.    Marylou is a pleasant 62-year-old female originally from [facility] and has just moved to [place] to be originally with her fiancé.  However, her fiancé unfortunately  unexpectedly.  I conveyed my condolences. She is recently retired.  Her brother is a paramedic.    She has had a history of chest pain in  that prompted a coronary angiography which revealed 30% LAD stenosis at that time.  Since  she has had intermittent palpitations of racing heartbeat up to 1 5160 bpm the last 1 to 2 minutes and associated with presyncope.  Initially it was infrequent and now is occurring about once a week.  She had a recent episode on  where she was just sitting and had racing heart and had presyncope.  Her paramedic brother has checked her pulse when this happens and the heart rates are in the 150s.  She was placed on a Zio patch monitor but during that monitoring period, she had no episodes.  It showed rare PAC or PVC.    She has had additional cardiac work-up and seen a cardiologist in the [facility].  She had a exercise stress nuclear study in 2021 and was negative for ischemia per report.  Echocardiogram revealed normal to hyperdynamic ejection fraction with mild pulmonary hypertension.  She is also seen a pulmonologist who did PFTs which apparently were normal.  She was also on refer for a sleep study which was abnormal and she has moderate obstructive sleep apnea.  She had not been prescribed CPAP but she bought CPAP herself but has not started using it.  She needs some help and wants referral to the sleep clinic.    She  also has hypertension.  She is on losartan 100 mg daily.  She was on 25 mg of hydrochlorothiazide but recently her potassium was low at 3 and therefore she was off hydrochlorothiazide.  Her blood pressure started rising and so she she restarted at 12.5 mg daily.  She is on atorvastatin 80 mg daily and aspirin.    She denies any chest pain or shortness of breath.  No orthopnea or PND.  Her main concern is palpitations and presyncope.    There is no family history of premature CAD.  She is not a smoker.    Exam  See below    Impression    1.  Episodic palpitations with presyncope  Echocardiogram revealed normal to hyperdynamic ejection fraction.  No evidence of structural heart disease.  Stress test revealed no ischemia.  Recent Zio patch monitor did not show any significant arrhythmias but patient was not symptomatic at this time.  I have suggested that we do a 30-day event monitor as she has symptoms at least once or twice a week.  This will help us correlate her symptoms with any arrhythmias.  If there are any significant arrhythmias, EP consultation would be indicated.  If required, we could consider adding beta-blockers in future for symptomatic relief.  Her previous EKG from May revealed sinus rhythm with first-degree AV block with a heart rate were in the 90s.    2.  Hypertension, blood pressure is elevated  She is on losartan 100 g daily and hydrochlorothiazide 12.5 mg daily.  We will check a BMP today to see where her potassium levels are.  If they are low or low normal, I would suggest adding Aldactone 25 mg daily for better blood pressure control as well as improving her potassium levels.  The other option would be to add Bystolic but I am holding it off as I wonder first finish the clinical testing with respect to her palpitations.  The beta-blockers may suppress her palpitations.    3.  Obstructive sleep apnea, will refer to the sleep clinic    4.  History of coronary artery atherosclerosis with 30% LAD  stenosis in 2016, continue risk factor modification.  Stress test from 2021 revealed no ischemia but was done in [facility].    Thank you for allowing us to part in the care of this nice patient.  She will return to see our EP PAVEL after the event monitor is completed.    Sincerely,    Suleman Ren MD          Today's clinic visit entailed:  Review of prior external note(s) from - Outside records from [facility]  Review of the result(s) of each unique test - nuclear test, echo, sleep study, cath report, bmp  Ordering of each unique test    Provider  Link to MDM Help Grid     The level of medical decision making during this visit was of moderate complexity.      Orders Placed This Encounter   Procedures     Basic metabolic panel     Follow-Up with Cardiology PAVEL     Adult Sleep Eval & Management Referral     Cardiac Event Monitor Adult Pediatric       Orders Placed This Encounter   Medications     atorvastatin (LIPITOR) 80 MG tablet     Sig: Take 80 mg by mouth daily Patient reported taking one tablet daily     hydrochlorothiazide (HYDRODIURIL) 12.5 MG tablet     Sig: Take 12.5 mg by mouth daily       There are no discontinued medications.      Encounter Diagnoses   Name Primary?     Primary hypertension Yes     SANTO (obstructive sleep apnea)      Palpitations      Pre-syncope      Pre-diabetes        CURRENT MEDICATIONS:  Current Outpatient Medications   Medication Sig Dispense Refill     aspirin (ASA) 81 MG chewable tablet Take 81 mg by mouth daily       atorvastatin (LIPITOR) 80 MG tablet Take 80 mg by mouth daily Patient reported taking one tablet daily       hydrochlorothiazide (HYDRODIURIL) 12.5 MG tablet Take 12.5 mg by mouth daily       losartan (COZAAR) 100 MG tablet Take 100 mg by mouth daily       methylPREDNISolone (MEDROL DOSEPAK) 4 MG tablet therapy pack Follow Package Directions (Patient not taking: Reported on 6/17/2022) 21 tablet 0       ALLERGIES   No Known Allergies    PAST MEDICAL HISTORY:  Past  "Medical History:   Diagnosis Date     Premature atrial contraction 05/12/2022     PVC's (premature ventricular contractions) 05/12/2022       PAST SURGICAL HISTORY:  No past surgical history on file.    FAMILY HISTORY:  No family history on file.    SOCIAL HISTORY:  Social History     Socioeconomic History     Marital status: Single     Spouse name: None     Number of children: None     Years of education: None     Highest education level: None   Tobacco Use     Smoking status: Never Smoker     Smokeless tobacco: Never Used   Substance and Sexual Activity     Alcohol use: Never       Review of Systems:  Skin:          Eyes:         ENT:         Respiratory:  Negative shortness of breath;dyspnea on exertion     Cardiovascular:  chest pain;Negative;edema palpitations;Positive for;lightheadedness;dizziness;syncope or near-syncope most recent episode of palpitations and near-syncope last sunday, occasional \"cramping\" left side of neck and jaw  Gastroenterology:        Genitourinary:         Musculoskeletal:  Positive for neck pain    Neurologic:         Psychiatric:         Heme/Lymph/Imm:  Negative allergies    Endocrine:  Negative thyroid disorder;diabetes      Physical Exam:  Vitals: BP (!) 145/95 (BP Location: Left arm, Patient Position: Sitting)   Pulse 67   Ht 1.562 m (5' 1.5\")   Wt 82.5 kg (181 lb 14.4 oz)   SpO2 99%   BMI 33.81 kg/m      Constitutional:  well nourished        Skin:             Head:  normocephalic        Eyes:  sclera white        Lymph:      ENT:           Neck:  JVP normal        Respiratory:  clear to auscultation         Cardiac: normal S1 and S2;regular rhythm   distant heart sounds                                                     GI:  not assessed this visit        Extremities and Muscular Skeletal:  no edema              Neurological:  no gross motor deficits        Psych:  Alert and Oriented x 3        CC  No referring provider defined for this encounter.    Thank you for " allowing me to participate in the care of your patient.      Sincerely,     Suleman Ren MD     [facility]

## 2022-06-20 ENCOUNTER — THERAPY VISIT (OUTPATIENT)
Dept: PHYSICAL THERAPY | Facility: CLINIC | Age: 62
End: 2022-06-20
Payer: COMMERCIAL

## 2022-06-20 ENCOUNTER — TELEPHONE (OUTPATIENT)
Dept: CARDIOLOGY | Facility: CLINIC | Age: 62
End: 2022-06-20

## 2022-06-20 DIAGNOSIS — I10 PRIMARY HYPERTENSION: Primary | ICD-10-CM

## 2022-06-20 DIAGNOSIS — M54.2 NECK PAIN: Primary | ICD-10-CM

## 2022-06-20 DIAGNOSIS — M25.511 CHRONIC PAIN OF BOTH SHOULDERS: ICD-10-CM

## 2022-06-20 DIAGNOSIS — G89.29 CHRONIC PAIN OF BOTH SHOULDERS: ICD-10-CM

## 2022-06-20 DIAGNOSIS — M25.512 CHRONIC PAIN OF BOTH SHOULDERS: ICD-10-CM

## 2022-06-20 PROCEDURE — 97110 THERAPEUTIC EXERCISES: CPT | Mod: GP | Performed by: PHYSICAL THERAPIST

## 2022-06-20 PROCEDURE — 97112 NEUROMUSCULAR REEDUCATION: CPT | Mod: GP | Performed by: PHYSICAL THERAPIST

## 2022-06-20 RX ORDER — HYDROCHLOROTHIAZIDE 12.5 MG/1
12.5 TABLET ORAL DAILY
Qty: 45 TABLET | Refills: 3 | Status: SHIPPED | OUTPATIENT
Start: 2022-06-20

## 2022-06-20 RX ORDER — LOSARTAN POTASSIUM 100 MG/1
100 TABLET ORAL DAILY
Qty: 90 TABLET | Refills: 3 | Status: SHIPPED | OUTPATIENT
Start: 2022-06-20

## 2022-06-20 NOTE — TELEPHONE ENCOUNTER
Health Call Center    Phone Message    May a detailed message be left on voicemail: yes     Reason for Call: Other: Patient stated she spoke with a member of her care team regarding some prescriptions that were supposed to be sent to her pharmacy last week. Patient is calling to report nothing was sent over. Please call patient back to discuss further, thank you.     Pharmacy: [facility] [address] [place] [address]    Action Taken: Message routed to:  Other: Cardiology    Travel Screening: Not Applicable

## 2022-06-20 NOTE — TELEPHONE ENCOUNTER
Return Pt call left message did send Losartan 100 mg and Hydrochlorothiazide 12.5 mg RX to pharmacy for refills. Left Pt phone message regarding this and phone number for this nurse if further questions or concerns.  Component      Latest Ref Rng & Units 6/17/2022   Sodium      133 - 144 mmol/L 140   Potassium      3.4 - 5.3 mmol/L 3.6   Chloride      94 - 109 mmol/L 109   Carbon Dioxide      20 - 32 mmol/L 27   Anion Gap      3 - 14 mmol/L 4   Urea Nitrogen      7 - 30 mg/dL 16   Creatinine      0.52 - 1.04 mg/dL 0.96   Calcium      8.5 - 10.1 mg/dL 9.6   Glucose      70 - 99 mg/dL 103 (H)   GFR Estimate      >60 mL/min/1.73m2 67    T Aurea RN

## 2022-06-20 NOTE — PROGRESS NOTES
Did send refills in as per Pt request did just see DR Ren and had BMP done potassium 3.6 in normal range. ANDRES Villar RN

## 2022-06-24 ENCOUNTER — HOSPITAL ENCOUNTER (OUTPATIENT)
Dept: CARDIOLOGY | Facility: CLINIC | Age: 62
Discharge: HOME | End: 2022-06-24
Attending: INTERNAL MEDICINE | Admitting: INTERNAL MEDICINE
Payer: COMMERCIAL

## 2022-06-24 DIAGNOSIS — R00.2 PALPITATIONS: ICD-10-CM

## 2022-06-24 DIAGNOSIS — I10 PRIMARY HYPERTENSION: ICD-10-CM

## 2022-06-24 PROCEDURE — 93272 ECG/REVIEW INTERPRET ONLY: CPT | Performed by: INTERNAL MEDICINE

## 2022-06-24 PROCEDURE — 93270 REMOTE 30 DAY ECG REV/REPORT: CPT

## 2022-09-10 ENCOUNTER — MYC MEDICAL ADVICE (OUTPATIENT)
Dept: CARDIOLOGY | Facility: CLINIC | Age: 62
End: 2022-09-10

## 2022-09-12 PROBLEM — M25.511 CHRONIC PAIN OF BOTH SHOULDERS: Status: RESOLVED | Noted: 2022-06-08 | Resolved: 2022-09-12

## 2022-09-12 PROBLEM — M54.2 NECK PAIN: Status: RESOLVED | Noted: 2022-06-08 | Resolved: 2022-09-12

## 2022-09-12 PROBLEM — M25.512 CHRONIC PAIN OF BOTH SHOULDERS: Status: RESOLVED | Noted: 2022-06-08 | Resolved: 2022-09-12

## 2022-09-12 PROBLEM — G89.29 CHRONIC PAIN OF BOTH SHOULDERS: Status: RESOLVED | Noted: 2022-06-08 | Resolved: 2022-09-12

## 2022-09-12 NOTE — PROGRESS NOTES
Discharge Note    Progress reporting period is from initial evaluation date (please see noted date below) to Jun 20, 2022.  Linked Episodes   Type: Episode: Status: Noted: Resolved: Last update: Updated by:   PHYSICAL THERAPY neck/Bshoulder pain 6-8-22 Active 6/8/2022 6/20/2022  8:00 AM Montse Romero, PT      Comments:       Marylou failed to follow up and current status is unknown.  Please see information below for last relevant information on current status.  Patient seen for 3 visits.    SUBJECTIVE  Subjective changes noted by patient:  Did supine retraction 2x/day. Sleeping a bit better. Reaching overhead is less painful. The pain is no longer in right forearm or right scapula.  .  Current pain level is 2/10.     Previous pain level was   (0-7/10).   Changes in function:  Yes (See Goal flowsheet attached for changes in current functional level)  Adverse reaction to treatment or activity: None    OBJECTIVE  Changes noted in objective findings: CAROM: flex=100%, ext=80%, RR=90%, LR=90%, HHF=570%, GGP=009%.     ASSESSMENT/PLAN  Diagnosis: neck/B shoulder pain   Updated problem list and treatment plan:   No updated problem list or treatment plan as patient did not return to PT and they are discharged at this time.    STG/LTGs have been met or progress has been made towards goals:  Yes, please see goal flowsheet for most current information  Assessment of Progress: current status is unknown.    Last current status: Pt is progressing as expected   Self Management Plans:  HEP  I have re-evaluated this patient and find that the nature, scope, duration and intensity of the therapy is appropriate for the medical condition of the patient.  Marylou continues to require the following intervention to meet STG and LTG's:  HEP.    Recommendations:  Discharge with current home program.  Patient to follow up with MD as needed.    Please refer to the daily flowsheet for treatment today, total treatment time and time spent  performing 1:1 timed codes.

## 2022-10-03 ENCOUNTER — HEALTH MAINTENANCE LETTER (OUTPATIENT)
Age: 62
End: 2022-10-03

## 2023-10-22 ENCOUNTER — HEALTH MAINTENANCE LETTER (OUTPATIENT)
Age: 63
End: 2023-10-22

## 2024-10-06 ENCOUNTER — HEALTH MAINTENANCE LETTER (OUTPATIENT)
Age: 64
End: 2024-10-06

## 2024-12-14 ENCOUNTER — HEALTH MAINTENANCE LETTER (OUTPATIENT)
Age: 64
End: 2024-12-14

## 2025-04-06 ENCOUNTER — HEALTH MAINTENANCE LETTER (OUTPATIENT)
Age: 65
End: 2025-04-06